# Patient Record
Sex: FEMALE | Race: WHITE | URBAN - METROPOLITAN AREA
[De-identification: names, ages, dates, MRNs, and addresses within clinical notes are randomized per-mention and may not be internally consistent; named-entity substitution may affect disease eponyms.]

---
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
## 2021-05-31 VITALS — WEIGHT: 124.9 LBS

## 2021-06-02 VITALS — WEIGHT: 127 LBS

## 2021-06-12 NOTE — PROGRESS NOTES
Subjective:   Helen Buchanan is a 16 y.o. female  No question data found.  Chief Complaint   Patient presents with     Cough     x 1 week, Pt states she hears queezing,      Sinus Problem     Sore Throat     Shortness of Breath   Symptoms started around  with a cough, runny nose, ears plugged. Has continued to cough and says chest hurts with coughing. Admits having the chills, but no fevers. Having a lot nasal congestion. Says sense of smell ok. Says energy level is lower than usual, but appetite has been ok. Says gets a headache from coughing. Has been using her inhaler only for sports, but has not helped for these symptoms. Denies nausea, vomiting, diarrhea or belly pain. Last used inhaler yesterday.   PMH - exercised induced asthma  PSH - none  Fx - HTN - neg, High cholesterol - mom, Hypothyroid - mom, DM - neg, Cancer, - neg, CAD - maternal GM  at age 55  Review of Systems  Const - Resp - see HPI  No Known Allergies    Current Outpatient Prescriptions:      albuterol (PROAIR HFA;PROVENTIL HFA;VENTOLIN HFA) 90 mcg/actuation inhaler, Inhale 2 puffs every 6 (six) hours as needed for wheezing (Only with activity)., Disp: , Rfl:      DM/PE/ACETAMINOPHEN/DOXYLAMINE (VICKS DAYQUIL-NYQUIL ORAL), Take by mouth., Disp: , Rfl:      fluticasone (FLONASE) 50 mcg/actuation nasal spray, 1 spray into each nostril daily., Disp: , Rfl:   There is no problem list on file for this patient.    Medical History Reviewed  Objective:     Vitals:    17 1842   BP: 92/60   Pulse: 76   Resp: 10   Temp: 98.3  F (36.8  C)   TempSrc: Oral   SpO2: 99%   Weight: 124 lb 14.4 oz (56.7 kg)   Gen - Pt in NAD  Eyes - Conjunctiva non injected, no drainage  Face - non TTP over frontal sinus areas; non TTP over maxillary area  Ears - external canals - no induration, Right TM - not injected, Left TM - not injected   Nose - not congested, no nasal drainage  Pharynx - non injected, tonsils 1+ size  Neck - supple, no cervical  adenopathy, no masses  Cor - RRR w/o murmur  Lungs - Good air entry, no crackles noted, but expiratory wheezes noted on auscultation - sporadic dry coughing noted  Skin - no lesions, no rashes noted    Results for orders placed or performed in visit on 08/22/17   Rapid Strep A Screen-Throat   Result Value Ref Range    Rapid Strep A Antigen No Group A Strep detected, presumptive negative No Group A Strep detected, presumptive negative   Lab result discussed on day of visit.      Assessment - Plan   Medical Decision Making -51-year-old patient with a history of exercise-induced asthma with increased recent cough and shortness of breath.  This is consistent with an acute exacerbation.  Rapid strep was negative.  Patient's wheezes were somewhat diminished after her albuterol neb.  Refill of Ventolin inhaler was given with a spacer.    1. Exercise-induced asthma with acute exacerbation    - Spacer W/O Mask  - predniSONE (DELTASONE) 20 MG tablet; Take 2 tablets (40 mg total) by mouth daily for 5 days.  Dispense: 10 tablet; Refill: 0    2. Exercise-induced asthma  - albuterol (PROAIR HFA;PROVENTIL HFA;VENTOLIN HFA) 90 mcg/actuation inhaler; Inhale 1-2 puffs every 4 (four) hours as needed for wheezing or shortness of breath (or coughing).  Dispense: 1 Inhaler; Refill: 0    3. Acute bronchitis  - albuterol nebulizer solution 2.5 mg (PROVENTIL); Take 3 mL (2.5 mg total) by nebulization once.    4. Throat pain  - Rapid Strep A Screen-Throat  - Group A Strep, RNA Direct Detection, Throat    At the conclusion of the encounter, assessment and plan were discussed.   All questions were answered.   The patient or guardian acknowledged understanding and was involved in the decision making regarding the overall care plan.    Patient Instructions   1. Continue drinking plenty of non-caffeine liquids   2. Tylenol or ibuprofen for fever or pain  3. If symptoms are not improving over the next 5-7 days, follow up with primary provider  4.  If you have any questions, call the clinic number   - You will be contacted within the next 48 hours ONLY if the confirmatory strep test is positive.   - Antibiotics will be prescribed if indicated.  - No sharing of food or beverage, until 48 hours is past

## 2021-06-16 PROBLEM — J45.990 EXERCISE-INDUCED ASTHMA: Status: ACTIVE | Noted: 2017-08-22

## 2021-06-22 NOTE — PROGRESS NOTES
Assessment:     1. Throat pain  Rapid Strep A Screen-Throat swab    Group A Strep, RNA Direct Detection, Throat   2. Acute pharyngitis, unspecified etiology  viscous lidocaine HC (LIDOCAINE) 2 % Soln viscous solution     Results for orders placed or performed in visit on 12/06/18   Rapid Strep A Screen-Throat swab   Result Value Ref Range    Rapid Strep A Antigen No Group A Strep detected, presumptive negative No Group A Strep detected, presumptive negative            Plan:     Differential diagnosis include but not limited to nasal congestion, mononucleosis, pharyngitis, strep infection.  Discussed with the patient on exam findings, I did not find any indication for bacterial infection.  Rapid strep done, negative.  Discussed with the patient and the mom in regard to the results.  We will treat this as a viral infection, will give patient viscous lidocaine to gargle and spit at least every 4 hours as needed.  Increase fluid intake.  May take ibuprofen or Tylenol for pain or discomfort.  Monitor for worsening symptoms.  May follow-up with PCP if symptoms does not resolve after treatment.  Patient and the mom verbalized understanding the plan of care.     Subjective:       18 y.o. female presents for evaluation of a sore throat times 1 week.  The patient reports that she has been having 5 days of stuffy nose, headache, cough.  She denies a fever, no nausea, vomiting, or diarrhea.  Has history of asthma therefore shortness of breath is usually common for her.  She has been using Advil for her symptoms.  Has seasonal allergies where she takes Zyrtec but she has not taken any Zyrtec recently of Flonase.  She is not sure if she has been exposed to anyone with similar symptoms at school.  Patient's mom is also here with similar symptoms.    The following portions of the patient's history were reviewed and updated as appropriate: allergies, current medications, past family history, past medical history, past social  history, past surgical history and problem list.    Review of Systems  A 12 point comprehensive review of systems was negative except as noted.      Objective:      /78 (Patient Site: Right Arm, Patient Position: Sitting, Cuff Size: Adult Small)   Pulse 104   Temp 98  F (36.7  C) (Oral)   Resp 16   Wt 127 lb (57.6 kg)   LMP 11/26/2018 (Exact Date)   SpO2 98%   General appearance: alert, appears stated age, cooperative and mild distress  Head: Normocephalic, without obvious abnormality, atraumatic, sinuses nontender to percussion, maxillary pressure with palpation  Eyes: conjunctivae/corneas clear. PERRL, EOM's intact. Fundi benign.  Ears: abnormal TM right ear - bulging and air-fluid level and abnormal TM left ear - bulging and air-fluid level  Nose: Nares normal. Septum midline. Mucosa normal. No drainage or sinus tenderness., moderate congestion  Throat: lips, mucosa, and tongue normal; teeth and gums normal  Lungs: clear to auscultation bilaterally  Heart: regular rate and rhythm, S1, S2 normal, no murmur, click, rub or gallop  Extremities: extremities normal, atraumatic, no cyanosis or edema  Pulses: 2+ and symmetric  Skin: Skin color, texture, turgor normal. No rashes or lesions  Lymph nodes: Cervical, supraclavicular, and axillary nodes normal.  Neurologic: Grossly normal     This note has been dictated using voice recognition software. Any grammatical or context distortions are unintentional and inherent to the software